# Patient Record
Sex: FEMALE | Race: ASIAN | NOT HISPANIC OR LATINO | URBAN - NONMETROPOLITAN AREA
[De-identification: names, ages, dates, MRNs, and addresses within clinical notes are randomized per-mention and may not be internally consistent; named-entity substitution may affect disease eponyms.]

---

## 2019-03-14 ENCOUNTER — IMPORTED ENCOUNTER (OUTPATIENT)
Dept: URBAN - NONMETROPOLITAN AREA CLINIC 1 | Facility: CLINIC | Age: 29
End: 2019-03-14

## 2019-03-14 PROBLEM — H52.223: Noted: 2019-03-14

## 2019-03-14 PROBLEM — H52.13: Noted: 2019-03-14

## 2019-03-14 PROCEDURE — S0620 ROUTINE OPHTHALMOLOGICAL EXA: HCPCS

## 2019-11-12 NOTE — PATIENT DISCUSSION
Cataract DOES NOT appear visually significant - BUT SOMETIMES IMPACT CAN BE GREATER THAT WHAT IT LOOKS LIKE.

## 2019-11-12 NOTE — PATIENT DISCUSSION
Reviewed that they tend to [address] over a few weeks, and then may not happen for many years. Patient instructed to return if they do not clear over a few weeks for further evaluation.

## 2020-11-24 NOTE — PATIENT DISCUSSION
11 24 20 HAVING MORE DIFF WITH [facility] - RECOMMEND EVAL WITH DR FLORENCE TO SEE IF CHANGE IN GLASSES OR CATARACT SURGERY OR CORNEAL ENDOTHELIAL SURGERY WOULD HELP.

## 2020-11-24 NOTE — PATIENT DISCUSSION
Reviewed that they tend to [address] over a few weeks, and then may not happen for many years. Patient instructed to return if they do not clear over a few weeks for further evaluation.

## 2022-04-09 ASSESSMENT — VISUAL ACUITY
OS_CC: 20/60
OD_CC: 20/50

## 2022-04-09 ASSESSMENT — TONOMETRY
OD_IOP_MMHG: 14
OS_IOP_MMHG: 14

## 2024-10-15 ENCOUNTER — NEW PATIENT (OUTPATIENT)
Dept: RURAL CLINIC 1 | Facility: CLINIC | Age: 34
End: 2024-10-15

## 2024-10-15 DIAGNOSIS — H52.13: ICD-10-CM

## 2024-10-15 DIAGNOSIS — H52.223: ICD-10-CM

## 2024-10-15 PROCEDURE — S0620AEC ROUTINE OPH EXAM INCLUDES REF/ NEW PATIENT
